# Patient Record
Sex: FEMALE | Race: WHITE
[De-identification: names, ages, dates, MRNs, and addresses within clinical notes are randomized per-mention and may not be internally consistent; named-entity substitution may affect disease eponyms.]

---

## 2018-07-24 ENCOUNTER — HOSPITAL ENCOUNTER (INPATIENT)
Dept: HOSPITAL 68 - ERH | Age: 28
LOS: 6 days | DRG: 753 | End: 2018-07-30
Attending: PSYCHIATRY & NEUROLOGY | Admitting: PSYCHIATRY & NEUROLOGY
Payer: COMMERCIAL

## 2018-07-24 VITALS — HEIGHT: 63 IN | BODY MASS INDEX: 34.62 KG/M2 | WEIGHT: 195.38 LBS

## 2018-07-24 DIAGNOSIS — F31.9: Primary | ICD-10-CM

## 2018-07-24 DIAGNOSIS — F11.90: ICD-10-CM

## 2018-07-24 LAB
ABSOLUTE GRANULOCYTE CT: 5.3 /CUMM (ref 1.4–6.5)
BASOPHILS # BLD: 0.1 /CUMM (ref 0–0.2)
BASOPHILS NFR BLD: 0.6 % (ref 0–2)
EOSINOPHIL # BLD: 0.8 /CUMM (ref 0–0.7)
EOSINOPHIL NFR BLD: 8.5 % (ref 0–5)
ERYTHROCYTE [DISTWIDTH] IN BLOOD BY AUTOMATED COUNT: 11.5 % (ref 11.5–14.5)
GRANULOCYTES NFR BLD: 59.2 % (ref 42.2–75.2)
HCT VFR BLD CALC: 45.4 % (ref 37–47)
LYMPHOCYTES # BLD: 2.3 /CUMM (ref 1.2–3.4)
MCH RBC QN AUTO: 29.3 PG (ref 27–31)
MCHC RBC AUTO-ENTMCNC: 33.5 G/DL (ref 33–37)
MCV RBC AUTO: 87.3 FL (ref 81–99)
MONOCYTES # BLD: 0.5 /CUMM (ref 0.1–0.6)
PLATELET # BLD: 265 /CUMM (ref 130–400)
PMV BLD AUTO: 9.7 FL (ref 7.4–10.4)
RED BLOOD CELL CT: 5.2 /CUMM (ref 4.2–5.4)
WBC # BLD AUTO: 8.9 /CUMM (ref 4.8–10.8)

## 2018-07-24 PROCEDURE — G0480 DRUG TEST DEF 1-7 CLASSES: HCPCS
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
## 2018-07-25 VITALS — SYSTOLIC BLOOD PRESSURE: 107 MMHG | DIASTOLIC BLOOD PRESSURE: 71 MMHG

## 2018-07-25 VITALS — DIASTOLIC BLOOD PRESSURE: 60 MMHG | SYSTOLIC BLOOD PRESSURE: 124 MMHG

## 2018-07-25 VITALS — SYSTOLIC BLOOD PRESSURE: 124 MMHG | DIASTOLIC BLOOD PRESSURE: 60 MMHG

## 2018-07-25 VITALS — DIASTOLIC BLOOD PRESSURE: 71 MMHG | SYSTOLIC BLOOD PRESSURE: 107 MMHG

## 2018-07-25 VITALS — DIASTOLIC BLOOD PRESSURE: 60 MMHG | SYSTOLIC BLOOD PRESSURE: 111 MMHG

## 2018-07-25 VITALS — SYSTOLIC BLOOD PRESSURE: 108 MMHG | DIASTOLIC BLOOD PRESSURE: 72 MMHG
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
## 2018-07-26 VITALS — DIASTOLIC BLOOD PRESSURE: 79 MMHG | SYSTOLIC BLOOD PRESSURE: 114 MMHG

## 2018-07-26 VITALS — DIASTOLIC BLOOD PRESSURE: 78 MMHG | SYSTOLIC BLOOD PRESSURE: 129 MMHG

## 2018-07-26 VITALS — SYSTOLIC BLOOD PRESSURE: 134 MMHG | DIASTOLIC BLOOD PRESSURE: 72 MMHG

## 2018-07-26 VITALS — SYSTOLIC BLOOD PRESSURE: 123 MMHG | DIASTOLIC BLOOD PRESSURE: 71 MMHG

## 2018-07-26 VITALS — DIASTOLIC BLOOD PRESSURE: 55 MMHG | SYSTOLIC BLOOD PRESSURE: 104 MMHG

## 2018-07-26 VITALS — DIASTOLIC BLOOD PRESSURE: 56 MMHG | SYSTOLIC BLOOD PRESSURE: 114 MMHG

## 2018-07-26 NOTE — HISTORY & PHYSICAL
General Information and HPI
MD Statement:
I have seen and personally examined PRISCILA BAIRES and documented this H&P.
 
The patient is a 27 year old F who presented with a patient stated chief 
complaint of depression with suicidal ideation.
 
Source of Information: patient
Exam Limitations: no limitations
History of Present Illness:
27-year-old female with past medical history significant for diabetes, 
depression who is admitted to [facility] with depression with suicidal ideation.  
Apparently patient had made some statements about suicidal ideation.  Patient's 
family member was not feeling safe therefore she is admitted to [facility].  She 
does have a history of diabetes and she had high blood sugars this afternoon.  
She claims that she does take insulin, metformin and another medicine.  She 
currently denies any aches, pains, chest pain, shortness of breath, nausea, 
vomiting, diarrhea, constipation, abdominal pain, urinary complaints.
 
Allergies/Medications
Allergies:
Coded Allergies:
nut - unspecified (Intermediate, RASH 07/25/18)
haloperidol (From HALDOL) (PT STATES RASH AND DIFF BREATH 07/24/18)
 
Home Med list
Benztropine Mesylate 1 MG TABLET   1 MG PO DAILY ?  (Reported)
Clonazepam 1 MG TABLET   1 MG BID anxiety  (Reported)
Duloxetine HCl (Cymbalta) 60 MG CAPSULE. PT  (Reported)
Gabapentin 400 MG CAPSULE   2 CAP PO TID mental health  (Reported)
Hydroxyzine Hydrochloride (Hydroxyzine) 10 MG TAB   1 TAB PO TID PRN ITCHING
Metformin HCl 500 MG TABLET   1 TAB PO 7:30 AM, & 4:30 PM DIABETES  (Reported)
Prednisone 10 MG TABLET   1 TAB PO DAILY DERMATITIS
     4 TABS PO DAY 1 
     3 TABS PO DAY 2
     2 TABS PO DAY 3
     1 TA PO DAY 4
Quetiapine Fumarate (Seroquel) 100 MG TABLET   1 TAB PO AT BEDTIME mental health
 (Reported)
 
 
Past History
 
Travel History
Traveled to [facility] past 21 day No
 
Medical History
Neurological: NONE
EENT: NONE
Cardiovascular: NONE
Respiratory: NONE
Gastrointestinal: NONE
Hepatic: NONE
Renal: NONE
Musculoskeletal: NONE
Psychiatric: NONE
Endocrine: NONE, diabetes
Blood Disorders: NONE
Cancer(s): NONE
Isolation History: Standard
 
Surgical History
Surgical History: non-contributory
 
Past Family/Social History
 
Family History
Relations & Conditions if any
FATHER
Relation not specified for:
  FHx: diabetes mellitus
 
 
Employment History
Employment Unemployed
Profession/Employer She was employed prior no further details
 
Review of Systems
 
Review of Systems
Constitutional:
Reports: see HPI. 
EENTM:
Reports: see HPI. 
Cardiovascular:
Reports: see HPI. 
Respiratory:
Reports: see HPI. 
GI:
Reports: see HPI. 
Musculoskeletal:
Reports: see HPI. 
Neurological/Psychological:
Reports: see HPI. 
 
Exam & Diagnostic Data
Last 24 Hrs of Vital Signs/I&O
 Vital Signs
 
 
Date Time Temp Pulse Resp B/P B/P Pulse O2 O2 Flow FiO2
 
     Mean Ox Delivery Rate 
 
07/26 1638  71  123/71     
 
07/26 1201  86  129/78     
 
07/26 0817 97.8 64  114/79     
 
07/26 0816 97.8 64  114/79     
 
07/26 0501 96.9 55  114/56     
 
07/26 0006 97.3 58  104/55     
 
07/25 2045 98.0 55  124/60     
 
07/25 1946 98.0 55  124/60     
 
 
 Intake & Output
 
 
 07/26 1600 07/26 0800 07/26 0000
 
Intake Total   
 
Output Total   
 
Balance   
 
    
 
Patient   195 lb
 
Weight   
 
 
 
 
Physical Exam
General Appearance Alert, Oriented X3, Cooperative
Skin No Rashes
HEENT PERRLA
Neck Supple
Cardiovascular Regular Rate, Normal S1, Normal S2
Lungs Clear to Auscultation
Abdomen Normal Bowel Sounds, Soft, No Tenderness
Neurological
   Cranial Nerves II through XII:
Intact
Last 24 Hrs of Labs/Rojas:
 Laboratory Tests
 
 
 07/24 1903
 
Chemistry 
 
  Sodium (137 - 145 mmol/L) 143
 
  Potassium (3.5 - 5.1 mmol/L) 4.4
 
  Chloride (98 - 107 mmol/L) 104
 
  Carbon Dioxide (22 - 30 mmol/L) 21  L
 
  Anion Gap (5 - 16) 18  H
 
  BUN (7 - 17 mg/dL) 9
 
  Creatinine (0.5 - 1.0 mg/dL) 0.6
 
  Estimated GFR (>60 ml/min) > 60
 
  BUN/Creatinine Ratio (7 - 25 %) 15.0
 
  Glucose (65 - 99 mg/dL) 123  H
 
  Hemoglobin A1c (4.2 - 5.8 %) 8.7  H
 
  Calcium (8.4 - 10.2 mg/dL) 10.7  H
 
  Total Bilirubin (0.2 - 1.3 mg/dL) 0.7
 
  AST (14 - 36 U/L) 24
 
  ALT (9 - 52 U/L) 41
 
  Alkaline Phosphatase (<127 U/L) 99
 
  Total Protein (6.3 - 8.2 g/dL) 7.9
 
  Albumin (3.5 - 5.0 g/dL) 4.9
 
  Globulin (1.9 - 4.2 gm/dL) 3.0
 
  Albumin/Globulin Ratio (1.1 - 2.2 %) 1.6
 
  Triglycerides (<150 mg/dL) 273  H
 
  Cholesterol (<200 MG/DL) 166
 
  LDL Cholesterol, Calc (65 - 129 mg/dL) 78
 
  HDL Cholesterol (40 - 60 mg/dL) 34  L
 
  Cholesterol/HDL Ratio (0.00 - 4.23 %) 5  H
 
  TSH &T3 &Free T4 Intrp (0.270 - 4.20 uIU/mL) 0.360
 
Hematology 
 
  CBC w Diff NO MAN DIFF REQ
 
  WBC (4.8 - 10.8 /CUMM) 8.9
 
  RBC (4.20 - 5.40 /CUMM) 5.20
 
  Hgb (12.0 - 16.0 G/DL) 15.2
 
  Hct (37 - 47 %) 45.4
 
  MCV (81.0 - 99.0 FL) 87.3
 
  MCH (27.0 - 31.0 PG) 29.3
 
  MCHC (33.0 - 37.0 G/DL) 33.5
 
  RDW (11.5 - 14.5 %) 11.5
 
  Plt Count (130 - 400 /CUMM) 265
 
  MPV (7.4 - 10.4 FL) 9.7
 
  Gran % (42.2 - 75.2 %) 59.2
 
  Lymphocytes % (20.5 - 51.1 %) 25.9
 
  Monocytes % (1.7 - 9.3 %) 5.8
 
  Eosinophils % (0 - 5 %) 8.5  H
 
  Basophils % (0.0 - 2.0 %) 0.6
 
  Absolute Granulocytes (1.4 - 6.5 /CUMM) 5.3
 
  Absolute Lymphocytes (1.2 - 3.4 /CUMM) 2.3
 
  Absolute Monocytes (0.10 - 0.60 /CUMM) 0.5
 
  Absolute Eosinophils (0.0 - 0.7 /CUMM) 0.8
 
  Absolute Basophils (0.0 - 0.2 /CUMM) 0.1
 
Toxicology 
 
  Serum Alcohol (<10 MG/DL) 64.0
 
 
 
 
 07/24
 
 1645
 
Toxicology 
 
  Urine Opiates Screen (>2000 NG/ML) < 100
 
  Methadone Screen (>300 NG/ML) 56
 
  Barbiturate Screen (>200 NG/ML) < 60
 
  Ur Phencyclidine Scrn (>25 NG/ML) < 6.00
 
  Amphetamines Screen (>1000 NG/ML) < 100
 
  U Benzodiazepines Scrn (>200 NG/ML) < 85
 
  Urine Cocaine Screen (>300 NG/ML) < 50
 
  Urine Cannabis Screen (>50 NG/ML) < 5.00
 
Urines 
 
  Urine Color (YEL,AMB,STR) YEL
 
  Urine Clarity (CLEAR) CLEAR
 
  Urine pH (5.0 - 8.0) 6.0
 
  Ur Specific Gravity (1.001 - 1.035) 1.015
 
  Urine Protein (NEG,<30 MG/DL) TRACE  H
 
  Urine Ketones (NEG) TRACE  H
 
  Urine Nitrite (NEG) NEG
 
  Urine Bilirubin (NEG) NEG
 
  Urine Urobilinogen (0.1  -  1.0 EU/dl) 0.2
 
  Ur Leukocyte Esterase (NEG) NEG
 
  Ur Microscopic SEDIMENT EXAMINED
 
  Urine RBC (0 - 5 /HPF) RARE
 
  Urine WBC (0 - 2 /HPF) RARE
 
  Ur Epithelial Cells (NONE,FEW) MOD  H
 
  Urine Bacteria (NEG/NONE) FEW  H
 
  Urine Hemoglobin (NEG) NEG
 
  Urine Glucose (N MG/DL) 100  H
 
  Urine Pregnancy Test NEGATIVE
 
 
 
 
Assessment/Plan
Assessment:
27-year-old female with past medical history significant for depression and 
diabetes admitted to [facility] with suicidal ideation and depression.
Blood sugars are not controlled.  I will continue metformin and add sliding 
scale insulin.
Continue Accu-Cheks.  Psych management will be up to psychiatry.
Hemoglobin A1c is not controlled.  Patient does need diabetic counseling and 
nutrition consult.
 
 
As Ranked By This Provider
Problem List:
 1. Depression with suicidal ideation
 
 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
 
 
Miscellaneous
 
Miscellaneous Documentation
Attending Case Discussed With:
Ashley Wagoner MD
Primary Care Physician:
Patient Has No Primary Care Dr
 
Patient sees these Specialists
psychiatrist
Level of Patient Care: [facility]

## 2018-07-26 NOTE — SOCIAL WORKER PROG NOTE PSYCH
Social Work Progress Note
Progress Note
A family meeting is scheduled for 1:30 pm. I will met with the patient again 
tomorrow before the meeting to check in and let her know of the expectations for
behavior during the meeting.
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
## 2018-07-26 NOTE — SOCIAL WORKER PROG NOTE PSYCH
Social Work Progress Note
Progress Note
I Francisco Beaumicah met with Mayuri to complete her social history she was in bed 
but was cooperative. Her response did not align with the diagnostic assessment 
from crisis. She mentioned that she has a love for animal and a supportive 
family. When I met with the patient she was in bed tired and she  reported that 
she did not sleep well last night.  She  denies feelings of depression and 
anxiety today, She also initially denied drinking but later in the afternoon 
admitted that she drank last night. DIscussed her feelings towards her mom but 
she denies making threat to harm her mother.  Currently she does not have 
negative feelings toward her mom. She seen people discharge from the  unit to 
today and desires to leave as well. I was able to have her sign releases for her
mother and therapist Nuzhat. I explained that she needs to be more open to 
discussing emotions and behaviors so that the clinical team can have a 
successful discharge plan. She challenged that by saying I am talking now and I 
said yes but you need some time to process what went on prior to admission. I 
informed her that I would call mom to set up a family meeting and I would  let 
her know when one is scheduled. At first she was resistant to having a family 
meeting saying patients left today without family meetings I just let her know 
that this would aid in the discharge process and she agreed.
I did reach out to her mother and she is checking with her job scheduling 
coordinator to see if she can come in tomorrow before the psychiatrist leaves 
for a family meeting. I gave her mom Ly Samson's number as a call back number to
see if tomorrow would work out to have family meeting.
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
## 2018-07-27 VITALS — DIASTOLIC BLOOD PRESSURE: 76 MMHG | SYSTOLIC BLOOD PRESSURE: 125 MMHG

## 2018-07-27 VITALS — SYSTOLIC BLOOD PRESSURE: 117 MMHG | DIASTOLIC BLOOD PRESSURE: 70 MMHG

## 2018-07-27 NOTE — SOCIAL WORKER PROG NOTE PSYCH
Social Work Progress Note
Progress Note
Doctor Gharaibeh, Ly Samson, and Francisco Conde met with Mayuri for a phone 
conference with her mother at 1:30 pm today. The patient was informed by her 
psychiatrist (doctor Gharaibeh) that she would be released into police custody. 
Ly Samson let her know the charges she has which include assaulting an office, 
threatening an officer and threatening to harm her mother. After care was 
discussed with Mayuri and she does not want to stop taking Clonazepam so she is 
hesitant towards IOP. Mayuri did express intentions to continue seeing her 
therapist Nuzhat from [facility], a substance abuse counselor and her APRN. 
Mom said she does not want her home unless she attends IOP for three days a 
week. Mayuri then agreed to IOP. The mom expressed concerns about Mayuri's 
resistance towards treatment. Mayuri walked out of the phone conference due to 
not feeling great. She was disappointed and pissed that she couldn't leave today
but rather stay here than be in long-term over the weekend.  Ly Samson was later 
informed after the meeting that Mayuri is not currently in police custody.  
I also called Nuzhat and left a message for her in regards to taking Mayuri back 
upon discharge. I left Ly Samson's number as a call back number .
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
## 2018-07-28 VITALS — SYSTOLIC BLOOD PRESSURE: 106 MMHG | DIASTOLIC BLOOD PRESSURE: 59 MMHG

## 2018-07-28 VITALS — SYSTOLIC BLOOD PRESSURE: 107 MMHG | DIASTOLIC BLOOD PRESSURE: 59 MMHG
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
## 2018-07-29 VITALS — SYSTOLIC BLOOD PRESSURE: 96 MMHG | DIASTOLIC BLOOD PRESSURE: 56 MMHG
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
## 2018-07-30 VITALS — DIASTOLIC BLOOD PRESSURE: 60 MMHG | SYSTOLIC BLOOD PRESSURE: 132 MMHG
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
## 2018-07-30 NOTE — SOCIAL WORKER PROG NOTE PSYCH
Social Work Progress Note
Faxed Referral(s)
   Referred To: [facility]
   Transition of Care Documents sent: Health Summary
   [facility]axed to: [facility]
   Fax #: [phone]
   Faxed by: Ly Samson
   Date faxed: 07/30/18
   Time Faxed: [phone]
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
## 2018-07-30 NOTE — SOCIAL WORKER PROG NOTE PSYCH
Social Work Progress Note
Progress Note
 
 
 
The services requested require additional review. You will be contacted 
regarding the status of this request if further information is needed. An 
authorization decision will be made within the required timeframes and details 
of that decision may be found under the member's authorization history.
 
 
 
 
Member Name Member ID Member  Subscriber Name  Subscriber ID 
 
PRISCILA BAIRES [id number] PRISCILA BAIRES  [id number]
 
    
 
Pended Authorization # Client Authorization # Type of Request  
 
[id number]  CONCURRENT   
 
    
 
Date of Admission/ Start of Services Requested From Submission Date  
 
2018   
 
    
 
Level of Service  Type of Service Level of Care  Type of Care  
 
INPATIENT/[facility] Mental Health Inpatient  Inpatient [facility] - Inpatient Hospital 
 
 
    
 
Reason Code    
 
P76
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
## 2018-08-20 ENCOUNTER — HOSPITAL ENCOUNTER (INPATIENT)
Dept: HOSPITAL 68 - ERH | Age: 28
LOS: 7 days | DRG: 753 | End: 2018-08-27
Attending: PSYCHIATRY & NEUROLOGY | Admitting: PSYCHIATRY & NEUROLOGY
Payer: COMMERCIAL

## 2018-08-20 VITALS — HEIGHT: 62 IN | BODY MASS INDEX: 34.96 KG/M2 | WEIGHT: 190 LBS

## 2018-08-20 DIAGNOSIS — F10.10: ICD-10-CM

## 2018-08-20 DIAGNOSIS — F15.90: ICD-10-CM

## 2018-08-20 DIAGNOSIS — F31.9: Primary | ICD-10-CM

## 2018-08-20 DIAGNOSIS — F60.89: ICD-10-CM

## 2018-08-20 LAB
ABSOLUTE GRANULOCYTE CT: 4.4 /CUMM (ref 1.4–6.5)
BASOPHILS # BLD: 0 /CUMM (ref 0–0.2)
BASOPHILS NFR BLD: 0.5 % (ref 0–2)
EOSINOPHIL # BLD: 0.1 /CUMM (ref 0–0.7)
EOSINOPHIL NFR BLD: 1.8 % (ref 0–5)
ERYTHROCYTE [DISTWIDTH] IN BLOOD BY AUTOMATED COUNT: 11.7 % (ref 11.5–14.5)
GRANULOCYTES NFR BLD: 66.3 % (ref 42.2–75.2)
HCT VFR BLD CALC: 41.4 % (ref 37–47)
LITHIUM SERPL-SCNC: 0.6 MMOL/L (ref 0.6–1.2)
LYMPHOCYTES # BLD: 1.7 /CUMM (ref 1.2–3.4)
MCH RBC QN AUTO: 29 PG (ref 27–31)
MCHC RBC AUTO-ENTMCNC: 33.9 G/DL (ref 33–37)
MCV RBC AUTO: 85.6 FL (ref 81–99)
MONOCYTES # BLD: 0.4 /CUMM (ref 0.1–0.6)
PLATELET # BLD: 206 /CUMM (ref 130–400)
PMV BLD AUTO: 9 FL (ref 7.4–10.4)
RED BLOOD CELL CT: 4.84 /CUMM (ref 4.2–5.4)
WBC # BLD AUTO: 6.7 /CUMM (ref 4.8–10.8)

## 2018-08-20 PROCEDURE — G0480 DRUG TEST DEF 1-7 CLASSES: HCPCS

## 2018-08-20 NOTE — ED GENERAL ADULT
**See Addendum**
History of Present Illness
 
General
Chief Complaint: Psychiatric Related Complaint
Stated Complaint: ATTEMPTED HANGING
Source: patient
Exam Limitations: no limitations
 
Vital Signs & Intake/Output
Vital Signs & Intake/Output
 Vital Signs
 
 
Date Time Temp Pulse Resp B/P B/P Pulse O2 O2 Flow FiO2
 
     Mean Ox Delivery Rate 
 
08/21 0238 98.0 88 20 128/87  100 Room Air  
 
08/21 0004 98.2 89 20 122/72  99 Room Air  
 
08/20 2157 98.3 102 18 126/68  98   
 
 
 ED Intake and Output
 
 
 08/21 0000 08/20 1200
 
Intake Total 0 
 
Output Total  
 
Balance 0 
 
   
 
Intake, Oral 0 
 
Patient 190 lb 
 
Weight  
 
Weight Reported by Patient 
 
Measurement  
 
Method  
 
 
 
Allergies
Coded Allergies:
nut - unspecified (Intermediate, RASH 07/25/18)
haloperidol (From HALDOL) (PT STATES RASH AND DIFF BREATH 07/24/18)
 
Reconcile Medications
Clonazepam 1 MG TABLET   1 MG BID anxiety  (Reported)
Duloxetine HCl (Cymbalta) 60 MG CAPSULE. PT  (Reported)
Gabapentin 400 MG CAPSULE   2 CAP PO TID mental health  (Reported)
Lithium Carbonate 300 MG CAPSULE   1 CAP PO BID MENTAL HEALTH  (Reported)
Metformin HCl 500 MG TABLET   1 TAB PO 7:30 AM, & 4:30 PM DIABETES  (Reported)
Quetiapine Fumarate (Seroquel) 100 MG TABLET   1 TAB PO AT BEDTIME mental health
 (Reported)
 
Triage Note:
BROUGHT IN BY AMBULANCE ON PEC ATTEMPT TO HAND
SELF WITH ROPE. MINOR ABRASIONS TO L WRIST, "THE
KNIFE WASN'T DEEP ENOUGH." ON PEC.
Triage Nurses Notes Reviewed? yes
Onset: Abrupt
Duration: minute(s):
Timing: single episode today
Pregnant: No
Patient currently breastfeeds: No
HPI:
28 y/o female with a h/o diabetes and depression presenting on a PEC for 
suicidal ideation with attempt just PTA. Pt attempted to hang herself with a 
rope. Tied the rope around her neck, but did not attach it to anything. Her 
mother called 911 in response to the event. Denies HI. Endorses drinking 5 nips 
today. No drug use. Denies pain.
(Maddie Atkins)
 
Past History
 
Travel History
Traveled to [place] past 21 day No
 
Medical History
Any Pertinent Medical History? see below for history
Neurological: NONE
EENT: NONE
Cardiovascular: NONE
Respiratory: NONE
Gastrointestinal: NONE
Hepatic: NONE
Renal: NONE
Musculoskeletal: NONE
Psychiatric: NONE
Endocrine: NONE, diabetes
Blood Disorders: NONE
Cancer(s): NONE
Isolation History: Standard
 
Surgical History
Surgical History: non-contributory
 
Psychosocial History
Who do you live with Mother
What is your primary language English
Tobacco Use: Refused to answer
 
Family History
Family History, If Any:
FATHER
Relation not specified for:
  FHx: diabetes mellitus
 
Hx Contributory? No
(Maddie Atkins)
 
Review of Systems
 
Review of Systems
Constitutional:
Reports: no symptoms. 
EENTM:
Reports: no symptoms. 
Respiratory:
Reports: no symptoms. 
Cardiovascular:
Reports: no symptoms. 
GI:
Reports: no symptoms. 
Genitourinary:
Reports: no symptoms. 
Musculoskeletal:
Reports: no symptoms. 
Skin:
Reports: no symptoms. 
Neurological/Psychological:
Reports: see HPI. 
Hematologic/Endocrine:
Reports: no symptoms. 
Immunologic/Allergic:
Reports: no symptoms. 
All Other Systems: Reviewed and Negative
(Maddie Atkins)
 
Physical Exam
 
Physical Exam
General Appearance: well developed/nourished, no apparent distress, alert, awake
Comments:
Gen.: Well-nourished, well-developed, no acute distress.
Head: Normocephalic, atraumatic.
Eyes: Normal inspection bilaterally
Ears: Normal inspection bilaterally
Nose: Normal inspection
Neck: Normal inspection, no ecchymosis or abrasions,no signs of strangulation
Lungs: clear to auscultation bilaterally, normnal breath sounds
Heart: regular rate and rhythm
Abdomen: soft and non-tender
Extremities: Normal inspection
Neurologic: alert and oriented x3, steady gait
Skin: warm and dry
Psychiatric: Normal mood and affect, no apparent delusions or hallucinations, 
behavior appropriate 
 
Core Measures
ACS in differential dx? No
CVA/TIA Diagnosis: No
Sepsis Present: No
Sepsis Focused Exam Completed? No
(Maddie Atkins)
 
Progress
Differential Diagnoses
I considered the following diagnoses in my evaluation of the patient: [suicidal 
ideation vs ETOH intoxication vs strangulation]
 
Plan of Care:
 Orders
 
 
Procedure Date/time Status
 
Regular Diet 08/21 B Active
 
FingerStick- Glucose 08/21 0234 Active
 
Add-on Test (ER Only) 08/20 2327 Active
 
LITHIUM 08/20 2300 Complete
 
Continuous Observation Monitor 08/20 2204 Active
 
URINE DRUGS OF ABUSE 08/20 2204 Complete
 
URINE PREGNANCY 08/20 2204 Complete
 
ETHANOL 08/20 2204 Complete
 
COMPREHENSIVE METABOLIC PANEL 08/20 2204 Complete
 
CBC WITHOUT DIFFERENTIAL 08/20 2204 Complete
 
ED CRISIS PSYCH CONSULT 08/20 2204 Active
 
 
 Current Medications
 
 
  Sig/Kati Start time  Last
 
Medication Dose  Stop Time Status Admin
 
Duloxetine HCl 60 MG DAILY 08/21 0900 UNVr 
 
(Cymbalta)     
 
Gabapentin 800 MG TID 08/21 0900 UNVr 
 
(Neurontin)     
 
Lithium Carbonate 300 MG 0800,2000 08/21 0800 UNVr 
 
(Lithium Carbonate)     
 
Metformin HCl 500 MG 0800,1700 08/21 0800 UNVr 
 
(Glucophage)     
 
Insulin Human Regular 4 UNITS ONCE ONE 08/21 0100 UNVr 08/21
 
(NovoLIN R)   08/21 0101  0149
 
Sodium Chloride 1,000 ML BOLUS ONE 08/21 0100 UNVr 08/21
 
(Normal Saline 0.9%)   08/21 0159  0241
 
Sodium Chloride 1,000 ML BOLUS ONE 08/21 0100 UNVr 08/21
 
(Normal Saline 0.9%)   08/21 0159  0147
 
Clonazepam 2 MG ONCE ONE 08/20 2345 UNVr 08/21
 
(Klonopin 1MG Tab)   08/20 2346  0002
 
Quetiapine Fumarate 100 MG QPM 08/20 2330 UNVr 08/20
 
(Seroquel)     2354
 
 
 Laboratory Tests
 
 
 
08/20/18 2300:
Anion Gap 11, Estimated GFR > 60, BUN/Creatinine Ratio 4.3  L, Glucose 413  H, 
Calcium 9.3, Total Bilirubin 0.9, AST 36, ALT 44, Alkaline Phosphatase 100, 
Total Protein 6.9, Albumin 4.0, Globulin 2.9, Albumin/Globulin Ratio 1.4, CBC w 
Diff NO MAN DIFF REQ, RBC 4.84, MCV 85.6, MCH 29.0, MCHC 33.9, RDW 11.7, MPV 9.0
, Gran % 66.3, Lymphocytes % 25.2, Monocytes % 6.2, Eosinophils % 1.8, Basophils
% 0.5, Absolute Granulocytes 4.4, Absolute Lymphocytes 1.7, Absolute Monocytes 
0.4, Absolute Eosinophils 0.1, Absolute Basophils 0, Lithium 0.6, Serum Alcohol 
79.0
 
08/20/18 2234:
Urine Opiates Screen < 100, Methadone Screen < 40, Barbiturate Screen < 60, Ur 
Phencyclidine Scrn < 6.00, Amphetamines Screen < 100, U Benzodiazepines Scrn < 
85, Urine Cocaine Screen < 50, Urine Cannabis Screen < 5.00, Urine Pregnancy 
Test NEGATIVE
 
Labs show serum thanol level 79 and hyperglycemia to 413. Ordered for IV fluids 
and subq insulin. Will recheck. Pt signed out to overnight attending with crisis
eval pending.
Initial ED EKG: none
(Maddie Atkins)
 
Departure
 
Departure
Disposition: STILL A PATIENT
Condition: Stable
Clinical Impression
Primary Impression: Suicidal ideation
Secondary Impressions: Alcohol intoxication, Suicide attempt
Referrals:
Patient Has No Primary Care Dr (PCP/Family)
 
Departure Forms:
Customer Survey
General Discharge Information
(Maddie Atkins)
 
PA/NP Co-Sign Statement
Statement:
ED Attending supervision documentation-
 
[] I saw and evaluated the patient. I have also reviewed all the pertinent lab 
results and diagnostic results. I agree with the findings and the plan of care 
as documented in the PA's/NP's documentation. 
 
[x] I have reviewed the ED Record and agree with the PA's/NP's documentation.
 
[] Additions or exceptions (if any) to the PAs/NP's note and plan are 
summarized below:
[]
 
(Jerad Germain DO)
 
Critical Care Note
 
Critical Care Note
Critical Care Time: non-applicable
(Maddie Atkins)

## 2018-08-21 VITALS — DIASTOLIC BLOOD PRESSURE: 64 MMHG | SYSTOLIC BLOOD PRESSURE: 128 MMHG

## 2018-08-21 VITALS — SYSTOLIC BLOOD PRESSURE: 124 MMHG | DIASTOLIC BLOOD PRESSURE: 66 MMHG

## 2018-08-21 NOTE — ED PSYCH CRISIS CONSULTATION
**See Addendum**
Crisis Consult
 
Basic Assessment
Date of Consult: 18
Responsible Person/Accompanied By: self
Insurance Authorization:
Insurance #1:
 
Insurance name: [employer]
Phone number: 
Policy number: [id number]
Group number: 
Authorization number: 
 
 
ED Provider:
Patient's ED Provider: Maddie Atkins
 
Primary Care Physician:
Patient's PCP: Patient Has No Primary Care Dr
PCP's Phone Number: 
 
Current Psychiatrist: Lexy Barriga
Chief Complaint: Psychiatric Related Complaint
Patient's Quote: "Yes" In response to question- where you trying to kill 
yourself?
Present Illness:
Pt is a 27 year old female BIBA last night on a PEER after mother called 911 
when mother found pt in the garage with a rope around her neck and made suicidal
statements to her mother. 
 
Crisis spoke to mother, Say Keita ([phone]) this morning. Mother 
states this is the first time the patient has done anything this significant- 
making a noose and putting it around her neck. Mom states that since pt's last 
discharge, she been depressed and getting worse. Mom states that over the last 
several days pt has been making suicidal statements such as- "I have a plan"; "I
'll do it"; "I'll tie a rope around my neck in the garage", and "I have the beam
picked out". Mom states yesterday, pt stormed out of the house while mom was 
getting her hair done by a friend. She believes the pt went out drinking as mom 
states she was acting as though he had some alcohol in her system. Of note, BAL 
was 79 upon arrival to the ED. Mom states that pt came back home, stated she was
tired of struggling and left the house again. Mom states she was nervous and 
went out after her which is when she found the patient with the rope tied around
her neck in the garage.
 
Additionally mom states that pt is supposed to go to an IOP intake tomorrow. Mom
states pt has been seeing her therapist and prescriber. Pt has recently started 
Lithium (level is 0.6 in ED). She is also taking Seroquel, Neurontin and 
Klonopin. Pt believes the Neurontin and Klonopin are the most helpful while mom 
believes the pt was at her best while she was prescribed Risperdal. Mom states 
when she was on Risperdal she was functiong well, was more sociable and had a 
job. 
 
Today patient presents as lethargic with a flat affect. She is soft spoken and 
somewhat guarded. She responsed to yes/no questions but had difficulty 
responding to open ended yesterdays. Pt does confirm that yesterday was a 
suicide attempt as she intended to die. She continues to want to be dead today. 
Pt has a history of HI towards her mother but denies HI today. She denies AH/VH.
She reports she drank 5 nips yesterday and that her last use of cocaine was 
before her last hospitalization (2018). UDS was negative in ED. 
Patient's Address:
[address] [place] [address]
Home Phone Number: [phone]
Other Phone Number: 
 
Who Do You Live With? Mother
Family/Informants Interviewed: Spoke with mother, Say- see present illness 
section
Allergies -
Coded Allergies:
nut - unspecified (Intermediate, RASH 18)
haloperidol (From HALDOL) (PT STATES RASH AND DIFF BREATH 18)
 
Current Medications -
Scheduled Medications
Clonazepam 1 MG TABLET   1 MG BID anxiety  (Reported)
     Entered as Reported by Katia Selby on 18
     Last Taken: 18 
Gabapentin 400 MG CAPSULE   2 CAP PO TID mental health  (Reported)
     Entered as Reported by Katia Selby on 18
     Last Taken: 18 
Lithium Carbonate 300 MG CAPSULE   1 CAP PO BID MENTAL HEALTH  (Reported)
     Entered as Reported by Tata Ahmadi on 18
Metformin HCl 500 MG TABLET   1 TAB PO 7:30 AM, & 4:30 PM DIABETES  (Reported)
     Entered as Reported by Anthony Kelly on 18 1639
     Last Taken: 18 
Quetiapine Fumarate (Seroquel) 100 MG TABLET   1 TAB PO AT BEDTIME mental health
 (Reported)
     Entered as Reported by Katia Selby on 18
     Last Taken: 18 
 
Miscellaneous Medications
Duloxetine HCl (Cymbalta) 60 MG CAPSULE. PT  (Reported)
     Entered as Reported by Katia Selby on 18
     Last Taken: 18 
 
Laboratory Results:
 Laboratory Tests
 
18 2300:
Anion Gap 11, Estimated GFR > 60, BUN/Creatinine Ratio 4.3  L, Glucose 413  H, 
Calcium 9.3, Total Bilirubin 0.9, AST 36, ALT 44, Alkaline Phosphatase 100, 
Total Protein 6.9, Albumin 4.0, Globulin 2.9, Albumin/Globulin Ratio 1.4, CBC w 
Diff NO MAN DIFF REQ, RBC 4.84, MCV 85.6, MCH 29.0, MCHC 33.9, RDW 11.7, MPV 9.0
, Gran % 66.3, Lymphocytes % 25.2, Monocytes % 6.2, Eosinophils % 1.8, Basophils
% 0.5, Absolute Granulocytes 4.4, Absolute Lymphocytes 1.7, Absolute Monocytes 
0.4, Absolute Eosinophils 0.1, Absolute Basophils 0, Lithium 0.6, Serum Alcohol 
79.0
 
184:
Urine Opiates Screen < 100, Methadone Screen < 40, Barbiturate Screen < 60, Ur 
Phencyclidine Scrn < 6.00, Amphetamines Screen < 100, U Benzodiazepines Scrn < 
85, Urine Cocaine Screen < 50, Urine Cannabis Screen < 5.00, Urine Pregnancy 
Test NEGATIVE
 
 
Past History
 
Past Medical History
Neurological: NONE
EENT: NONE
Cardiovascular: NONE
Respiratory: NONE
Gastrointestinal: NONE
Hepatic: NONE
Renal: NONE
Musculoskeletal: NONE
Psychiatric: alcohol dependence, anxiety, depression
Endocrine: diabetes
Blood Disorders: NONE
Cancer(s): NONE
 
Past Surgical History
Surgical History: non-contributory
 
Psychosocial History
Strengths/Capabilities:
supportive mother
complaint with medications 
Physical Limitations (Interventions):
N/A
 
Psychiatric Treatment History
Psych Treatment
   Psychiatric Treatment Yes
   Inpatient Treatment Yes
   Outpatient Treatment Yes
   [facility]- [facility]; [facility]
   Reason for Treatment
anxiety 
depression
   Dates of Treatment IP- [facility] ( & ), [facility] 
OP
   Response to Treatment
pt has been decompensating for 3 weeks (1 week post discharge from [facility])
Diagnosis by History:
Unspecified Depressive Disorder F32.9
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
Mother reports- Borderline Personality D/O
 
Substance Use/Abuse History
Drug Use/Abuse 1 
   Substances Used/Abused Yes
   Substance Used/Abused Alcohol
   Last Used 18
   How much used/taken 5 nips
   How often "first time in a while"
   Route of use oral
Drug Use/Abuse 2 
   Substances Used/Abused Yes
   Substance Used/Abused Cocaine
   Last Used before last hospitalization which was 2018
 
Substance Abuse Treatment
Substance Abuse Treatment
   Past Substance Abuse [place] No
 
Current Mental Status
 
Mental Status
Orientation: Person, Place, Situation
Affect: Flat
Speech: Soft
Neuro-vegetative: Appetite Decreased, Sleep Disturbance
 
Appearance
Appearance- Dress/Hygiene:
Pt presents in hospital scrubs. She has a tattoo on right foot. Hair is unkempt 
in a messy pony tail. Hygiene appears adequate. 
 
Behaviors
Thought Process: Logical/Rational
Thought Content: WNL
Memory: WNL
Insight: Fair
 
SI/HI Risk Assessment
Past Suicidal Ideation/Attempts Yes
Current Suicidal Ideation/Att Yes
Past Homicidal Ideation/Att: Yes
Current Homicidal Ideation/Attempts No
Degree of Intent: Made Preparations, Plan
Danger To: Self
Gravely Disabled: Lack of Insight, Poor Impulse Control, Poor Judgment
Risk Factors: history of suicide atmpts, SA/MH hospitalized, substance abuse, 
poor impulse control, limited support
Lethality Ratin
 
PTSD Checklist
PTSD Done? patient declined
 
ED Management
Sitter: Yes
Restraints: No
 
DSM5/PS Stressors/Medical Prob
Diagnosis' (DSM 5, Stressors, Medical):
F33.2  Major Depressive Disorder, Recurrent, Severe
R/O Bipolar Disorder 
F10.20 Alcholo Use Disorder, Moderate
F14.10 Stimulant Use Disorder 
 
Current GAF: 18
 
Departure
 
Disposition
Psych Medical Clearance
   Date: 18
   Medically Cleared at: 0830
   Time Started: 0830
   Time Ended: 0850
   Psychiatrist Consulted: Dr. Radha Parker
Date Disposition Established: 18
Time Disposition Established: 851
Plan for Disposition -
   Modality: Inpatient Psychiatry
   Facility: [facility]
Rationale for Disposition:
Pt requires acute 
Referrals
Patient Has No Primary Care Dr (PCP/Family)

## 2018-08-21 NOTE — IP CRISIS DIAG ASSESS PSYCH
Diagnostic Assessment
 
Basic Assessment
Insurance Authorization:
Insurance #1:
 
Insurance name: [facility]
Phone number: 
Policy number: [id number]
Group number: 
Authorization number: 
 
***Pt was authorized for 3 units from 18-18. Auth #[id number] ***
Primary Care Physician:
Patient's PCP: Patient Has No Primary Care Dr
PCP's Phone Number: 
 
Patient's Quote: "Yes" In response to question- where you trying to kill 
yourself?
Present Illness:
Pt is a 27 year old female BIBA last night on a PEER after mother called 911 
when mother found pt in the garage with a rope around her neck and made suicidal
statements to her mother. 
 
Crisis spoke to mother, Say Keita ([phone]) this morning. Mother 
states this is the first time the patient has done anything this significant- 
making a noose and putting it around her neck. Mom states that since pt's last 
discharge, she been depressed and getting worse. Mom states that over the last 
several days pt has been making suicidal statements such as- "I have a plan"; "I
'll do it"; "I'll tie a rope around my neck in the garage", and "I have the beam
picked out". Mom states yesterday, pt stormed out of the house while mom was 
getting her hair done by a friend. She believes the pt went out drinking as mom 
states she was acting as though he had some alcohol in her system. Of note, BAL 
was 79 upon arrival to the ED. Mom states that pt came back home, stated she was
tired of struggling and left the house again. Mom states she was nervous and 
went out after her which is when she found the patient with the rope tied around
her neck in the garage.
 
Additionally mom states that pt is supposed to go to an IOP intake tomorrow. Mom
states pt has been seeing her therapist and prescriber. Pt has recently started 
Lithium (level is 0.6 in ED). She is also taking Seroquel, Neurontin and 
Klonopin. Pt believes the Neurontin and Klonopin are the most helpful while mom 
believes the pt was at her best while she was prescribed Risperdal. Mom states 
when she was on Risperdal she was functiong well, was more sociable and had a 
job. 
 
Today patient presents as lethargic with a flat affect. She is soft spoken and 
somewhat guarded. She responsed to yes/no questions but had difficulty 
responding to open ended yesterdays. Pt does confirm that yesterday was a 
suicide attempt as she intended to die. She continues to want to be dead today. 
Pt has a history of HI towards her mother but denies HI today. She denies AH/VH.
She reports she drank 5 nips yesterday and that her last use of cocaine was 
before her last hospitalization (2018). UDS was negative in ED. 
Patient's Address:
[address] [place] [address]
Home Phone Number: [phone]
Other Phone Number: 
 
Who Do You Live With? Mother
Feel Safe Where You Live? Yes
Marital Status: single
Do You Have Children? No
Primary Language? English
Language(s) Spoken At Home: English
Family/Informants Interviewed: Spoke with motherSay- see present illness 
section
Allergies -
Coded Allergies:
nut - unspecified (Intermediate, RASH 18)
haloperidol (From HALDOL) (PT STATES RASH AND DIFF BREATH 18)
 
Current Medications -
Scheduled Medications
Clonazepam 1 MG TABLET   1 MG BID anxiety  (Reported)
     Entered as Reported by Katia Selby on 18
     Last Taken: 18 
Gabapentin 400 MG CAPSULE   2 CAP PO TID mental health  (Reported)
     Entered as Reported by Katia Selby on 18
     Last Taken: 18 
Lithium Carbonate 300 MG CAPSULE   1 CAP PO BID MENTAL HEALTH  (Reported)
     Entered as Reported by Tata Ahmadi on 18
Metformin HCl 500 MG TABLET   1 TAB PO 7:30 AM, & 4:30 PM DIABETES  (Reported)
     Entered as Reported by Anthony Kelly on 18 1639
     Last Taken: 18 
Quetiapine Fumarate (Seroquel) 100 MG TABLET   1 TAB PO AT BEDTIME mental health
 (Reported)
     Entered as Reported by Katia Selby on 18
     Last Taken: 18 
 
Miscellaneous Medications
Duloxetine HCl (Cymbalta) 60 MG CAPSULE.DR MALLOY  (Reported)
     Entered as Reported by Katia Selby on 18
     Last Taken: 18 
 
Consequences of Psych Med Use:
Mom reports patient was most successful on Risperdal- social, happier and had a 
job
 
Pt was d/c Risperdal- reasons unknown- possibly related to 100 pound weight gain
over a period of time. 
 
Pt was recently started on Lithium. 
Lab Results:
 Laboratory Tests
 
18 2300:
Anion Gap 11, Estimated GFR > 60, BUN/Creatinine Ratio 4.3  L, Glucose 413  H, 
Calcium 9.3, Total Bilirubin 0.9, AST 36, ALT 44, Alkaline Phosphatase 100, 
Total Protein 6.9, Albumin 4.0, Globulin 2.9, Albumin/Globulin Ratio 1.4, CBC w 
Diff NO MAN DIFF REQ, RBC 4.84, MCV 85.6, MCH 29.0, MCHC 33.9, RDW 11.7, MPV 9.0
, Gran % 66.3, Lymphocytes % 25.2, Monocytes % 6.2, Eosinophils % 1.8, Basophils
% 0.5, Absolute Granulocytes 4.4, Absolute Lymphocytes 1.7, Absolute Monocytes 
0.4, Absolute Eosinophils 0.1, Absolute Basophils 0, Lithium 0.6, Serum Alcohol 
79.0
 
184:
Urine Opiates Screen < 100, Methadone Screen < 40, Barbiturate Screen < 60, Ur 
Phencyclidine Scrn < 6.00, Amphetamines Screen < 100, U Benzodiazepines Scrn < 
85, Urine Cocaine Screen < 50, Urine Cannabis Screen < 5.00, Urine Pregnancy 
Test NEGATIVE
 
Toxicology Screen Completed? Yes
Results: negative
Symptoms of Use:
Pt drinks alcohol (last use yesterday- 5 nips)
Pt has a hx of cocaine use- last use prior to previous [facility] in 2018.
 
Past History
 
Past Medical History
Medical History: Diabetes
 
Past Surgical History
Surgical History unobtainable
 
Abuse/Trauma History
Trauma History/Current Trauma: Denies
 
Legal History
Current Legal Status: family relations court
Have you ever been arrested? Yes
Number of Arrests: 2
Pending Court Dates:
pt has court on 10/11/18- charges threatening, interferring w/ officer and 
assult of police office (felony). 
 
Psychosocial History
Strengths/Capabilities:
supportive mother
complaint with medications
Physical Limitations (Interventions):
N/A
 
Psychiatric Treatment History
Psych Treatment
   Psychiatric Treatment Yes
   Inpatient Treatment Yes
   Outpatient Treatment Yes
   [facility]; [facility]
   Reason for Treatment
anxiety
 depression
   [facility]- [facility] ( [facility], [facility] 
OP
   Response to Treatment
pt has been decompensating for 3 weeks (1 week post [facility])
Diagnosis by History:
Unspecified Depressive Disorder F32.9
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
Mother reports- Borderline Personality D/O
Risk Factors: history of suicide atmpts, SA/MH hospitalized, substance abuse, 
poor impulse control, limited support
 
Substance Use/Abuse History
Drug Use/Abuse minimum 12mo Hx 1 
   Substances Used/Abused Yes
   Substance Used/Abused Cocaine
   Last Used before last hospitalization which was 2018
   How much used/taken 5 nips
   How often "first time in a while"
   Route of use oral
Drug Use/Abuse minimum 12mo Hx 2 
   Substances Used/Abused Yes
   Substance Used/Abused Alcohol
   Last Used 18
   How much used/taken 5 nips
   How often "first time in a while"
   Route of use oral
 
Substance Abuse Treatment
Substance Abuse Treatment
   Past Substance Abuse [place] No
 
Sexual History
Sexual Concerns:
none
 
Education History
Highest Level of Education: high school/GED
 
Current Mental Status
 
Mental Status
Orientation: Person, Place, Situation
Affect: Flat
Speech: Soft
Neuro-vegetative: Appetite Decreased, Sleep Disturbance
 
Appearance
Appearance- Dress/Hygiene:
Pt presents in hospital scrubs. She has a tattoo on right foot. Hair is
 unkempt in a messy pony tail. Hygiene appears adequate.
 
Behaviors
Thought Process: Logical/Rational
Thought Content: WNL
Memory: WNL
Insight: Fair
 
SI/HI Risk Assessment
- Minimum 6mo History-
Past Suicidal Ideation/Attempts Yes
Current Suicidal Ideation/Att Yes
Past Homicidal Ideation/Att: Yes
Current Homicidal Ideation/Attempts No
Degree of Intent: Made Preparations, Plan
Danger To: Self
Gravely Disabled: Lack of Insight, Poor Impulse Control, Poor Judgment
Risk Factors: history of suicide atmpts, SA/MH hospitalized, substance abuse, 
poor impulse control, limited support
Lethality Ratin
Needs/Init TX Plan/Goals:
Medication Evaluation 
Psychiatric Evaluation
Comphrensive Psychosocial Evaluation
Individual Therapy
Group Therapy
Family Therapy
AUDIT-C Questionnaire:
 
 
AUDIT-C Questionnaire: Response Value
 
ETOH use in the past year Monthly or less 1
 
# drinks typical/day 5 or 6 2
 
6 or > drinks per occasion Less than monthly 1
 
Total   4
 
 
 
DSM5/PS Stressors/Medical Prob
Diagnosis' (DSM 5, Stressors, Medical):
F33.2  Major Depressive Disorder, Recurrent,
Severe
R/O Bipolar Disorder
F10.20 Alcholo Use Disorder, Moderate
F14.10 Stimulant Use Disorder
Current GAF: 18

## 2018-08-22 VITALS — DIASTOLIC BLOOD PRESSURE: 74 MMHG | SYSTOLIC BLOOD PRESSURE: 150 MMHG

## 2018-08-22 VITALS — SYSTOLIC BLOOD PRESSURE: 104 MMHG | DIASTOLIC BLOOD PRESSURE: 62 MMHG

## 2018-08-22 NOTE — SOCIAL WORKER PROG NOTE PSYCH
Social Work Progress Note
Progress Note
Pt had a disruption after meeting with Dr. Gharaibeh, and shit down after that. 
She has a hx of aggression and not communicating verbally her needs.  She can be
demanding and unpredictable.  She refused to meet, choosing to lay in her bed.
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
## 2018-08-22 NOTE — PN- GEN MED
Assessment/Plan Medical
Assessment:
Suicidal ideation with attempt- management per [facility]
 
DM- on insulin . started her back on her home insulin . f/u on blood sugars. BS 
are better controlled now. 
 
Depression/ anxiety- management as per psych
Problem List:
 1. Depression with suicidal ideation
 
 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
 
 3. Suicidal ideation
 
 4. Suicide attempt
 
 5. Alcohol intoxication
 
 
Subjective
Subjective:
Pt not very cooperative and not willing to answer all the questions. Visited her
yesterday and she refused to be seen yesterday. 
 
Review of Systems
Constitutional:
Denies: fever. 
Cardiovascular:
Denies: chest pain, palpitations. 
Respiratory:
Denies: cough, short of breath. 
Gastrointestinal:
Denies: abdominal pain. 
Musculoskeletal:
Denies: back pain. 
Neurological/Psychological:
Reports: anxiety, depressed. 
 
Objective
Last 24 Hrs of Vital Signs/I&O
 Vital Signs
 
 
Date Time Temp Pulse Resp B/P B/P Pulse O2 O2 Flow FiO2
 
     Mean Ox Delivery Rate 
 
08/22 0821 97.5 86  104/62     
 
08/21 1950  87  128/64     
 
 
 
 
Physical Exam
General Appearance: Alert, No Acute Distress
Cardiovascular: Regular Rate, Normal S1, Normal S2
Lungs: Clear to Auscultation, Normal Air Movement
Neurological: Normal Speech

## 2018-08-23 VITALS — DIASTOLIC BLOOD PRESSURE: 73 MMHG | SYSTOLIC BLOOD PRESSURE: 124 MMHG

## 2018-08-23 VITALS — SYSTOLIC BLOOD PRESSURE: 134 MMHG | DIASTOLIC BLOOD PRESSURE: 84 MMHG
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
## 2018-08-23 NOTE — SOCIAL WORKER PROG NOTE PSYCH
Social Work Progress Note
Progress Note
Pt is refusing treatment at this time, will not communicate with staff or social
worker, is not attending groups, offers "I signed myself in here I will sign 
myself out". She will ot sign a release for her Mom today.  As we explore 
discharge options she wants to go to [facility] in [place] and will need an intake for 
next week.  It does not appear she is at her baseline and will need to become 
more willing to engage in milieu to address her mental health issues. WIll 
continue to reach out.

## 2018-08-24 VITALS — SYSTOLIC BLOOD PRESSURE: 137 MMHG | DIASTOLIC BLOOD PRESSURE: 73 MMHG

## 2018-08-24 VITALS — DIASTOLIC BLOOD PRESSURE: 65 MMHG | SYSTOLIC BLOOD PRESSURE: 117 MMHG
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
## 2018-08-24 NOTE — SOCIAL WORKER PROG NOTE PSYCH
Social Work Progress Note
Progress Note
 
PRISCILA PRATHERKAVITHA
[id number]
1990
 
 
Pended Authorization #[id number]
Client Authorization #[id number]
 
Type of Request
CONCURRENT
 
 
Date of Admission/ Start of Services  08/21/2018
Requested From 08/24/2018
Submission Date 08/24/2018
   
 
 
 
   
 
 
 
CONCURRENT
 
 
Date of Admission/ Start of Services
Requested From
Submission Date
   
08/21/2018
08/24/2018
08/24/2018
   
 
Level of Service
Type of Service
Level of Care
Type of Care
 
INPATIENT/HLOC
MENTAL HEALTH
INPATIENT
INPATIENT HOSPITAL - INPATIENT HOSPITAL
 
 
Reason Code
 
P76
 
 
Provider Name & Address
Provider ID
Provider Alternate ID
NPI # for Authorization
ANDREW MARIE
[address] [place] [id number]
[id number]
[id number]
[id number]

## 2018-08-24 NOTE — SOCIAL WORKER PROG NOTE PSYCH
Social Work Progress Note
Progress Note
Pt refused to sign releases for [facility] and her mother Say Keita [phone]
[phone]. Pt was asked to sign other releases for this clinician to contact 
[facility] and pt said "I will contact [facility] myself 
and set up an appointment". 
 
Pt did not want to engaged in conversation, said she was feeling tired and 
wanted to lay in bed.
 
Pt also, said that her mother aSy is angry and worried that she is being 
dischargd too early from [employer]PS. 
 
However, pt is not participating in groups and not will to engage in discharge 
planning for a safe discharge that includes follow-up treatment.

## 2018-08-25 VITALS — DIASTOLIC BLOOD PRESSURE: 67 MMHG | SYSTOLIC BLOOD PRESSURE: 87 MMHG

## 2018-08-25 VITALS — SYSTOLIC BLOOD PRESSURE: 136 MMHG | DIASTOLIC BLOOD PRESSURE: 83 MMHG
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
## 2018-08-26 VITALS — SYSTOLIC BLOOD PRESSURE: 143 MMHG | DIASTOLIC BLOOD PRESSURE: 75 MMHG

## 2018-08-26 VITALS — DIASTOLIC BLOOD PRESSURE: 71 MMHG | SYSTOLIC BLOOD PRESSURE: 137 MMHG
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
## 2018-08-27 VITALS — SYSTOLIC BLOOD PRESSURE: 117 MMHG | DIASTOLIC BLOOD PRESSURE: 62 MMHG

## 2018-08-27 LAB — LITHIUM SERPL-SCNC: 0.5 MMOL/L (ref 0.6–1.2)

## 2018-08-27 NOTE — SOCIAL WORKER PROG NOTE PSYCH
Social Work Progress Note
Progress Note
 
 
 
The services requested require additional review. You will be contacted 
regarding the status of this request if further information is needed. An 
authorization decision will be made within the required timeframes and details 
of that decision may be found under the member's authorization history.
 
 
 
 
Member Name Member ID Member  Subscriber Name  Subscriber ID 
 
PRSICILA BAIRES [id number] 1990 PRISCILA BAIRES  [id number]
 
    
 
Pended Authorization # Client Authorization # Type of Request  
 
[id number]  CONCURRENT   
 
    
 
Date of Admission/ Start of Services Requested From Submission Date  
 
2018   
 
    
 
Level of Service  Type of Service Level of Care  Type of Care  
 
INPATIENT/[facility] Mental Health Inpatient  [facility] 
 
 
    
 
Reason Code    
 
P76    
 
    
 
Provider Name & Address Provider ID Provider Alternate ID NPI # for 
Authorization 
 
LOUANN HUNTER  [id number]  
 
[address] [place] [id number]

## 2018-08-27 NOTE — SOCIAL WORKER PROG NOTE PSYCH
Social Work Progress Note
Progress Note
Prompted Mayuri to get up out of bed this morning to meet.  She responded and got
up.  Shirley Castro - [facility] health worker and I met with Mayuri together.  She
looked fatigued.  She shared that she tried to get into [facility] in [place] by doing a
walk-in appt., but got frustrated with the process because she wasn't being seen
and was being skipped over by other people that had appts.  She reports that she
had an appt. on 8/22, but ended up here.  She stated that she attempted suicide 
by hanging and said she was cut down.  She said the trigger for the event was 
her drinking and that when she drinks things don't go well.  She reports being 
here over the past several days has been helpful to catch up on sleep.  She 
reported not sleeping well over the few weeks prior to admission.  Reported many
stressors were also on her mind and she just couldn't take it anymore.  She 
stated what she did was "scary."  Denies current thoughts of self harm.  
Appeared tired today.  She feels the meds she are on currently are sedating, but
doesn't seem to mind that.  She was in agreement to have a phone conference with
her Mom and signed a release to do so.  We called Ms. Bull.  She said she was 
concerned with the attempt and her lack of structure.  She wants a secured appt.
with an IOP.  I told her that we will attempt to secure an appt. as quick as we 
can with [facility], but there are no guarentees that we can get her in within a week.
 Explained that due to her Klonopin we can not refer to other options at this 
time, that may be faster.  Mayuri has also gotten connected to her Mother's 
therapist.  Mayuri doesn't seem to mind that she is seeing the same person as her
Mother as I offered to give her a list of other therapists in the area.  Mom 
will be contacting her therapist today to see when she will be seeing Mayuri.  
Mayuri seems to think she has an appt. there today at 4pm.  Mayuri did not want me
to contact her.  She also reports she has an APRN she is seeing, but did not 
want to share this person's name with me.  Mom was fine with picking Mayuri up 
today at 1pm.  It sounds like she is also looking into other spiritual supports 
for her as well.  
[facility] was called. First available intake appt. was 9/18 1:30.  Mayuri was informed
of this and offered to have a sooner taper of her Klonopin to go to [facility] as a 
possible option, but she refused this option.  She chose to keep that appt. and 
go to a walk in in the meantime.

## 2018-08-27 NOTE — DISCHARGE SUMMARY REPORT-PSYCH
Visit Information
 
Visit Dates/Diagnosis'
Admission Date:
08/21/18
 
Discharge Date: 08/27/18
Reason for Admission:
27-year-old female brought to the emergency room by ambulance.  EMS was called 
by the patient's mother who found the patient in the garage with a rope around 
her neck.  She was not hanging.  Blood alcohol in the emergency room was 79.
The patient had been making suicidal statements prior to presentation according 
to her mother.  She had threatened to tie a rope around her neck in the garage. 
Prior to presentation the patient had left the house, angry.  Her mother was 
having her hair done by a friend.  She returned and according to her mother had 
apparently been drinking.  BAL in the emergency room was 79.  The patient told 
her mother that she was tired of struggling and left the house again.  The 
patient's mother was nervous and went out after her when she found the patient 
with a rope around her neck in the garage.
Psy Discharge Primary Diag: unspecified bipolar d/o by hx
Psy Discharge Secondary Diag: Cluster B traits alcohol and stimulant ise
 
Hospital Course
Significant Lab Findings:
 Lab
 
 
Lithium 0.6 mmol/L 08/20/18 2300
 
Lithium 0.5 mmol/L L 08/27/18 0730
 
Serum Alcohol 79.0 MG/DL 08/20/18 2300
 
Urine Pregnancy Test NEGATIVE 08/20/18 2234
 
 
 
Course
Consultations:
The patient was seen by hospitalist for full review of systems and physical 
examination on admission.
Allergies:
Coded Allergies:
nut - unspecified (Intermediate, RASH 07/25/18)
haloperidol (From HALDOL) (PT STATES RASH AND DIFF BREATH 07/24/18)
 
Hospital Course/TX Response:
The patient was treated by Dr. Gharaibeh.  Preadmission medications were 
continued.  She was commenced on chlorpromazine every 4 hours as needed for 
agitation.  Clonazepam was reduced to 1.5 mg p.o. at bedtime.
The patient was seen by me on the day of discharge.  She was not suicidal or 
homicidal and there were no psychotic symptoms.  The patient was strongly 
encouraged to attend the intensive outpatient program at [facility].  This
would have involved tapering her clonazepam over 3 weeks.  The patient declined 
and stated that if we did taper her clonazepam she would continue to take the 
previous dose at home.  The patient receives her medications from an APRN but 
apparently did not disclose who this was during her admission.  She refused 
admission first contact her.  The patient ultimately agreed to attend [facility].  She
has an appointment for September 18 but was strongly encouraged to attend early 
for a walk-in appointment as soon as possible.  Family meeting was held by the 
unit  with the patient and her mother.  Her mother was in support 
of this treatment plan.  Please see  notes for details.  
 
The patient preadmissions medications were continued.  She reports she had an 
adequate supply of these medications at home.  At discharge she was given a 
prescription for procarbazine 50 mg up to 3 times a day if needed for anxiety or
agitation.  Clonazepam was also reduced to 1.5 mg.  No prescription for 
clonazepam was given.
 
Discharge HBIPS
- Tobacco Use Treatment Offered
Post DC Medications Offered: Script Given-See Med List
Post DC Tobacco Treatment Plan: Refused Tobacco Tx Pgm
- EtOH/Drug Use D/O Treatment Offered
Post DC Medications Offered: Ref Med EtOH/Drug Use D/O
Post DC EtOH/SubAbuse TX Plan: Other SubAbuse/Dual Pgm
Program Appt Date: 09/18/18
 
Metabolic Screening
- Screen if on a Neuroleptic Medication
- Metabolic screening should include:
- Blood Pressure, BMI, Glucose or Hgb A1c, & a
- Lipid profile from within the past 365 days.
Metabolic Screening
() Not Applicable, patient not on a neuroleptic.
 
 OR
 
() Patient on a neuroleptic(s) .
     Enter below results for Hemoglobin A1C, 
     and lipid panel if obtained during the last 365 days.
 
BMI: 34.700     July 24, 2018:
 
Blood Pressure: 117/62
 
Laboratory Results From [facility] E[facility] (If applicable):
July 24, 2018: Cholesterol 166, triglycerides 273, HDL 34, LDL 78 , HbA1c 8.7
 
 
Discharge Instructions
 
General Discharge Information
Multiple Neuroleptics:
() Not Applicable
      
     OR
   
 Document below three failed attempts at monotherapy, or a plan to taper to 
 monotherapy, or augmentation of Clozapine.
 ()
 
Discharge Diet Diabetic
Discharge Activity As Tolerated
DC Disposition:
The patient is being discharged home to live with her mother.  As mentioned she 
declined referral to IOP.
Referrals
Ordered Referrals
[facility] 09/18/18
[address] [place] [address] [place] [address]
[phone]
 
[facility] IOP for mental health and substance 
use treatment  
Intake 9/18/18 1:30pm  
Walk In evals: Mon, Tues, Thurs 
12:30-3pm  
First come first serve without guarentee 
of being seen  
[address] [place] [address]  
[phone]
 
Provider Referral 09/05/18
For Groups:
Outpatient Psychiatry
 
[facility]  
9/5/18 at 4pm  
[address] [place] [address]
 
 
 
Prescriptions
Continue taking these medications:
Clonazepam (Clonazepam) 1 MG TABLET
    1 Milligram 2100
    Comments:
       Last Taken:TO START TONIGHT
             Time:RECEIVED 1.5MG ON 8/26/18 AT 8PM
           
 
Gabapentin (Gabapentin) 400 MG CAPSULE
    2 Capsule ORAL THREE TIMES DAILY
    Comments:
       Last Taken:8/27/18
             Time:8:30AM
         
 
Quetiapine Fumarate (Seroquel) 100 MG TABLET
    1 Tablet ORAL AT BEDTIME
    Comments:
       Last Taken:8/26/18
             Time:8PM
           
 
Duloxetine HCl (Cymbalta) 60 MG CAPSULE.
    
    Comments:
       Last Taken:8/27/18
             Time:8:30AM
 
Metformin HCl (Metformin HCl) 500 MG TABLET
    1 Tablet ORAL 7:30AM & 4:30PM
    Comments:
       Last Taken:8/27/18
             Time:8:30AM
           
 
Lithium Carbonate (Lithium Carbonate) 300 MG CAPSULE
    1 Capsule ORAL TWICE DAILY
    Comments:
       Last Taken:8/27/18
             Time:8:30AM
 
Insulin Detemir (Levemir) 100 UNIT/ML VIAL
    12 Units SC TWICE DAILY
    Comments:
       Last Taken:8/27/18
             Time:8:30AM
 
Start taking the following new medications:
Chlorpromazine HCl (Chlorpromazine HCl) 100 MG TABLET
    100 Milligram ORAL Q8H as needed for AGITATION
    Qty = 30
    No Refills
    Comments:
       Last Taken:8/26/18
             Time:9PM
 
 
Studies Pending at Discharge
None
Copies To:
.